# Patient Record
Sex: FEMALE | Race: WHITE | NOT HISPANIC OR LATINO
[De-identification: names, ages, dates, MRNs, and addresses within clinical notes are randomized per-mention and may not be internally consistent; named-entity substitution may affect disease eponyms.]

---

## 2019-02-13 PROBLEM — Z00.00 ENCOUNTER FOR PREVENTIVE HEALTH EXAMINATION: Status: ACTIVE | Noted: 2019-02-13

## 2019-03-19 ENCOUNTER — APPOINTMENT (OUTPATIENT)
Dept: BREAST CENTER | Facility: CLINIC | Age: 69
End: 2019-03-19
Payer: MEDICARE

## 2019-03-19 VITALS
WEIGHT: 135 LBS | BODY MASS INDEX: 25.49 KG/M2 | DIASTOLIC BLOOD PRESSURE: 75 MMHG | HEART RATE: 81 BPM | HEIGHT: 61 IN | SYSTOLIC BLOOD PRESSURE: 134 MMHG

## 2019-03-19 DIAGNOSIS — Z87.59 PERSONAL HISTORY OF OTHER COMPLICATIONS OF PREGNANCY, CHILDBIRTH AND THE PUERPERIUM: ICD-10-CM

## 2019-03-19 DIAGNOSIS — Z78.9 OTHER SPECIFIED HEALTH STATUS: ICD-10-CM

## 2019-03-19 PROCEDURE — 99215 OFFICE O/P EST HI 40 MIN: CPT

## 2019-03-19 NOTE — ASSESSMENT
[FreeTextEntry1] : 69 yo History of stage I and a right breast cancer estrogen positive, JOCELYN\par Due for bilateral mammogram and ultrasound March 2020\par She does not want to followup with medical oncology\par Followup with me in one year for clinical breast exam\par We reviewed risk reduction strategies including maintaining a BMI <25, limiting red meat intake and alcoholic beverages to 3 per week and exercise (150 min/ week low intensity or 75 min/week high intensity). And maintaining a normal vitamin D level.\par \par She knows to call or return sooner should any concerns or questions arise.\par

## 2019-03-19 NOTE — PHYSICAL EXAM
[Normocephalic] : normocephalic [Atraumatic] : atraumatic [Supple] : supple [No Supraclavicular Adenopathy] : no supraclavicular adenopathy [Examined in the supine and seated position] : examined in the supine and seated position [No dominant masses] : no dominant masses in right breast  [No dominant masses] : no dominant masses left breast [No Nipple Retraction] : no left nipple retraction [No Nipple Discharge] : no left nipple discharge [No Axillary Lymphadenopathy] : no left axillary lymphadenopathy [No Edema] : no edema [No Rashes] : no rashes [No Ulceration] : no ulceration [de-identified] : Well-healed Wise pattern scar, diffuse brawny induration, there is a 2-1/2 cm mass 12:00 area were noted consistent with known fat necrosis. There is a second small 0.5 cm similar area under the area looked at 4 to 5:00. No suspicious masses, nontender no skin changes, no nodules [de-identified] : healed Wise pattern [de-identified] : healed axillary inciison

## 2019-03-19 NOTE — HISTORY OF PRESENT ILLNESS
[FreeTextEntry1] : This is a 69 yo woman s/p right partial mastectomy,SNE, and symmetrizing reduction/pexy (DDP) on 1/18/2007 for a stage 1a invasive ductal carcinoma, T1c (1.1cm) N0(sn,ihc-), 5/9, no LVI,  ER/[place]+, her2-.  Her oncotype was 20 (Dr. Cao). She is s/p RTX ([place]). She is s/p 3 years of tamoxifen, which she stopped on her own because she "didn't like the way it made (me) feel". She is BRCA negative. \par \par Patient has no breast complaints today. She is helping take care of her mother after she had fallen and incurred a rotator cuff injury.\par \par She does SBE. \par She has not noticed a change in her breast or a breast lump\par She has not noticed a change in her nipple or nipple area.\par She has not noticed a change in the skin of the breast.\par She is not experiencing nipple discharge.\par She is not experiencing breast pain.\par She has not noticed a lump or lymph node under the armpit. \par \par BREAST CANCER RISK FACTORS\par Menarche: 14\par Menopause: 52\par Grav:  2    Para:  1\par Age at first live birth: 27\par Nursed: Yes\par Hysterectomy: No\par Oophorectomy: No\par OCP: Yes x  5y\par HRT: No\par Last pap/pelvic exam: 10/2014 WNL\par Related family history: No\par Ashkenazi: Yes\par Tyrer-[place]/NCI lifetime risk: N/A\par BRCA testing: Yes negative (comprehensive)\par \par Last mammogram: 3/5/2019          Location: [facility] Report reviewed.                                \par Results: Birads 2\par No evidence of malignancy\par \par Last ultrasound:  3/5/2019       Location: [facility] Report reviewed.                                \par Results: Birads 2\par No evidence of malignancy\par \par \par Last MRI:        preop

## 2020-01-30 ENCOUNTER — APPOINTMENT (OUTPATIENT)
Dept: NEPHROLOGY | Facility: CLINIC | Age: 70
End: 2020-01-30
Payer: MEDICARE

## 2020-01-30 VITALS
SYSTOLIC BLOOD PRESSURE: 152 MMHG | RESPIRATION RATE: 16 BRPM | HEIGHT: 61 IN | WEIGHT: 140 LBS | BODY MASS INDEX: 26.43 KG/M2 | OXYGEN SATURATION: 98 % | HEART RATE: 88 BPM | DIASTOLIC BLOOD PRESSURE: 66 MMHG

## 2020-01-30 DIAGNOSIS — M19.90 UNSPECIFIED OSTEOARTHRITIS, UNSPECIFIED SITE: ICD-10-CM

## 2020-01-30 DIAGNOSIS — D64.9 ANEMIA, UNSPECIFIED: ICD-10-CM

## 2020-01-30 DIAGNOSIS — Z78.9 OTHER SPECIFIED HEALTH STATUS: ICD-10-CM

## 2020-01-30 DIAGNOSIS — Z92.89 PERSONAL HISTORY OF OTHER MEDICAL TREATMENT: ICD-10-CM

## 2020-01-30 DIAGNOSIS — I10 ESSENTIAL (PRIMARY) HYPERTENSION: ICD-10-CM

## 2020-01-30 DIAGNOSIS — E11.9 TYPE 2 DIABETES MELLITUS W/OUT COMPLICATIONS: ICD-10-CM

## 2020-01-30 DIAGNOSIS — E61.1 IRON DEFICIENCY: ICD-10-CM

## 2020-01-30 DIAGNOSIS — N20.0 CALCULUS OF KIDNEY: ICD-10-CM

## 2020-01-30 DIAGNOSIS — N17.9 ACUTE KIDNEY FAILURE, UNSPECIFIED: ICD-10-CM

## 2020-01-30 PROCEDURE — 99204 OFFICE O/P NEW MOD 45 MIN: CPT

## 2020-01-30 RX ORDER — METFORMIN HYDROCHLORIDE 625 MG/1
TABLET ORAL
Refills: 0 | Status: DISCONTINUED | COMMUNITY
End: 2020-01-30

## 2020-01-30 RX ORDER — INSULIN GLARGINE 300 U/ML
INJECTION, SOLUTION SUBCUTANEOUS
Refills: 0 | Status: DISCONTINUED | COMMUNITY
End: 2020-01-30

## 2020-01-30 RX ORDER — ASPIRIN 81 MG
81 TABLET, DELAYED RELEASE (ENTERIC COATED) ORAL
Refills: 0 | Status: DISCONTINUED | COMMUNITY
End: 2020-01-30

## 2020-01-30 NOTE — ASSESSMENT
[FreeTextEntry1] : Mary James is a 69 year old woman referred for the following BUN/creatinine trend:  18/1.01 (03/07/2019), 26/1.51 (12/12/2019). She has diabetes mellitus, hypertension treated with losartan, dyslipidemia, iron deficiency, a remote history of nephrolithiasis, and arthritis initially treated with NSAIDS (diclofenac, Advil) and currently treated with Plaquenil.  She is seenig Dr. Martin who is working up the arthritic symptoms.  This history is highly suggestive of ANIYA secondary to NSAID use while taking losartan.  Of note, Ms. James had a urinalysis which demonstrated 1+ dipstick albuminuria without any microscopic hematuria in March of 2019. I can not rule out underlying renal disease.\par \par RECOMMENDATIONS:\par \par As Ms. James recently had labs with Dr Gloria No I am not ordering anything yet.  Ms. James should have the following tests done:  BMP, urinalysis, random urine for microalbumin and creatinine. \par \par Renal ultrasound.\par \par Avoid the use of all NSAIDS.\par \par Stay well hydrated.\par \par If all tests are 'normal', I will see Ms. James in the future at your request.   She will call me in 2 weeks. \par

## 2020-01-30 NOTE — HISTORY OF PRESENT ILLNESS
[FreeTextEntry1] : Mary James is a 69 year old woman referred for the following BUN/creatinine trend:  18/1.01 (03/07/2019), 26/1.51 (12/12/2019). Ms. James has a past medical history significant for diabetes mellitus since age 42 (suspected peripheral neuropathy, denies diabetic retinopathy), hypertension, dyslipidemia, anemia with iron deficiency, arthritis (recently started on Plaquenil), nephrolithiasis (first renal colic in 2003, lithotripsy on 2 occasions, last event prior to 2010), and breast cancer (ER positive) s/p partial mastectomy (Tamoxifen for 2 years) with reconstruction and radiation therapy.  Her hypertension is treated with losartan.  The dose has not changed in the past year.   Ms. James denies being ill at the time of the lab studies in December.  She takes Advil "often".  She might have been taking diclofenac prior to the lab studies of 12/12/2019.  She has since stopped.

## 2020-01-30 NOTE — REVIEW OF SYSTEMS
[Feeling Tired] : feeling tired [Joint Pain] : joint pain [Cough] : cough [Loss Of Hearing] : hearing loss [Negative] : Endocrine [FreeTextEntry2] : has anemia, sleeps poorly [FreeTextEntry6] : new cough ( was sick), present for 1.5 weeks, non productive, not improving [FreeTextEntry8] : nocturia x 3 [FreeTextEntry9] : elbows, knees, ankles, wrists, toes

## 2020-01-30 NOTE — CONSULT LETTER
[Dear  ___] : Dear  [unfilled], [Consult Letter:] : I had the pleasure of evaluating your patient, [unfilled]. [Please see my note below.] : Please see my note below. [Consult Closing:] : Thank you very much for allowing me to participate in the care of this patient.  If you have any questions, please do not hesitate to contact me. [Sincerely,] : Sincerely, [FreeTextEntry3] : Geoffrey [DrYesi  ___] : Dr. ECHOLS [DrYesi ___] : Dr. ECHOLS

## 2020-01-30 NOTE — PHYSICAL EXAM
[General Appearance - Alert] : alert [Sclera] : the sclera and conjunctiva were normal [General Appearance - In No Acute Distress] : in no acute distress [Extraocular Movements] : extraocular movements were intact [Neck Appearance] : the appearance of the neck was normal [] : no respiratory distress [Respiration, Rhythm And Depth] : normal respiratory rhythm and effort [Exaggerated Use Of Accessory Muscles For Inspiration] : no accessory muscle use [Auscultation Breath Sounds / Voice Sounds] : lungs were clear to auscultation bilaterally [Heart Sounds] : normal S1 and S2 [Heart Rate And Rhythm] : heart rate was normal and rhythm regular [Heart Sounds Gallop] : no gallops [Murmurs] : no murmurs [Heart Sounds Pericardial Friction Rub] : no pericardial rub [Full Pulse] : the pedal pulses are present [Edema] : there was no peripheral edema [Bowel Sounds] : normal bowel sounds [Abdomen Soft] : soft [Abdomen Tenderness] : non-tender [No CVA Tenderness] : no ~M costovertebral angle tenderness [No Spinal Tenderness] : no spinal tenderness [Abnormal Walk] : normal gait [Nail Clubbing] : no clubbing  or cyanosis of the fingernails [Involuntary Movements] : no involuntary movements were seen [Skin Color & Pigmentation] : normal skin color and pigmentation [No Focal Deficits] : no focal deficits [Oriented To Time, Place, And Person] : oriented to person, place, and time [Impaired Insight] : insight and judgment were intact [Affect] : the affect was normal [Mood] : the mood was normal

## 2020-03-17 ENCOUNTER — APPOINTMENT (OUTPATIENT)
Dept: BREAST CENTER | Facility: CLINIC | Age: 70
End: 2020-03-17

## 2021-03-16 ENCOUNTER — APPOINTMENT (OUTPATIENT)
Dept: BREAST CENTER | Facility: CLINIC | Age: 71
End: 2021-03-16
Payer: MEDICARE

## 2021-03-16 VITALS
HEART RATE: 70 BPM | WEIGHT: 145 LBS | SYSTOLIC BLOOD PRESSURE: 138 MMHG | DIASTOLIC BLOOD PRESSURE: 80 MMHG | HEIGHT: 61 IN | BODY MASS INDEX: 27.38 KG/M2

## 2021-03-16 DIAGNOSIS — N64.1 FAT NECROSIS OF BREAST: ICD-10-CM

## 2021-03-16 DIAGNOSIS — Z82.49 FAMILY HISTORY OF ISCHEMIC HEART DISEASE AND OTHER DISEASES OF THE CIRCULATORY SYSTEM: ICD-10-CM

## 2021-03-16 DIAGNOSIS — Z80.42 FAMILY HISTORY OF MALIGNANT NEOPLASM OF PROSTATE: ICD-10-CM

## 2021-03-16 DIAGNOSIS — Z92.3 PERSONAL HISTORY OF IRRADIATION: ICD-10-CM

## 2021-03-16 DIAGNOSIS — Z85.3 PERSONAL HISTORY OF MALIGNANT NEOPLASM OF BREAST: ICD-10-CM

## 2021-03-16 DIAGNOSIS — Z80.8 FAMILY HISTORY OF MALIGNANT NEOPLASM OF OTHER ORGANS OR SYSTEMS: ICD-10-CM

## 2021-03-16 PROCEDURE — 99215 OFFICE O/P EST HI 40 MIN: CPT

## 2021-03-16 NOTE — HISTORY OF PRESENT ILLNESS
[FreeTextEntry1] : This is a 69 yo woman s/p right partial mastectomy,SNE, and symmetrizing reduction/pexy (DDP) on 1/18/2007 for a stage 1a invasive ductal carcinoma, T1c (1.1cm) N0(sn,ihc-), 5/9, no LVI,  ER/[place]+, her2-.  Her oncotype was 20 (Dr. Cao). She is s/p RTX ([place]). She is s/p 3 years of tamoxifen, which she stopped on her own because she "didn't like the way it made (me) feel". She is BRCA negative. \par \par Patient has no breast complaints today. She is helping take care of her mother after she had fallen and incurred a rotator cuff injury. She finished her vaccination 3/14-Moderna left arm.\par \par She does SBE. \par She has not noticed a change in her breast or a breast lump\par She has not noticed a change in her nipple or nipple area.\par She has not noticed a change in the skin of the breast.\par She is not experiencing nipple discharge.\par She is not experiencing breast pain. She is experiencing right axilla soreness with touch. For about 3 months ago. She is right handed. She has some shoulder pain as well.\par She has not noticed a lump or lymph node under the armpit. \par \par BREAST CANCER RISK FACTORS\par Menarche: 14\par Menopause: 52\par Grav:  2    Para:  1\par Age at first live birth: 27\par Nursed: Yes\par Hysterectomy: No\par Oophorectomy: No\par OCP: Yes x  5y\par HRT: No\par Last pap/pelvic exam: 10/2014 WNL\par Related family history: No\par Ashkenazi: Yes\par Tyrer-[place]/NCI lifetime risk: N/A\par BRCA testing: Yes negative (comprehensive)\par \par Last mammogram: 3/10/2021         Location: [place]\par Report reviewed.                                \par Results: Birads 2\par No evidence of malignancy\par \par Last ultrasound:  3/10/2021       Location: [place]\par Report reviewed.                                \par Results: Birads 2\par No evidence of malignancy\par \par Last MRI:        preop

## 2021-03-16 NOTE — ASSESSMENT
[FreeTextEntry1] : 71 yo History of stage I and a right breast cancer estrogen positive, JOCELYN\par Due for bilateral mammogram and ultrasound March 2022\par She does not want to followup with medical oncology\par Followup with me in one year for clinical breast exam\par We reviewed risk reduction strategies including maintaining a BMI <25, limiting red meat intake and alcoholic beverages to 3 per week and exercise (150 min/ week low intensity or 75 min/week high intensity). And maintaining a normal vitamin D level.\par rec orthopedic cs for right shoulder\par She knows to call or return sooner should any concerns or questions arise.\par

## 2021-03-16 NOTE — PHYSICAL EXAM
[Normocephalic] : normocephalic [Atraumatic] : atraumatic [Supple] : supple [No Supraclavicular Adenopathy] : no supraclavicular adenopathy [Examined in the supine and seated position] : examined in the supine and seated position [No dominant masses] : no dominant masses in right breast  [No dominant masses] : no dominant masses left breast [No Nipple Retraction] : no left nipple retraction [No Nipple Discharge] : no left nipple discharge [No Axillary Lymphadenopathy] : no left axillary lymphadenopathy [No Edema] : no edema [No Rashes] : no rashes [No Ulceration] : no ulceration [de-identified] : Well-healed Wise pattern scar, diffuse brawny induration, No suspicious masses, nontender no skin changes, no nodules [de-identified] : healed Wise pattern [de-identified] : healed axillary inciison

## 2022-03-15 ENCOUNTER — APPOINTMENT (OUTPATIENT)
Dept: BREAST CENTER | Facility: CLINIC | Age: 72
End: 2022-03-15
Payer: MEDICARE

## 2022-03-15 ENCOUNTER — NON-APPOINTMENT (OUTPATIENT)
Age: 72
End: 2022-03-15

## 2022-03-15 VITALS
HEART RATE: 79 BPM | DIASTOLIC BLOOD PRESSURE: 72 MMHG | HEIGHT: 61 IN | WEIGHT: 145 LBS | BODY MASS INDEX: 27.38 KG/M2 | SYSTOLIC BLOOD PRESSURE: 185 MMHG

## 2022-03-15 PROCEDURE — 99214 OFFICE O/P EST MOD 30 MIN: CPT

## 2022-03-15 RX ORDER — SITAGLIPTIN AND METFORMIN HYDROCHLORIDE 50; 1000 MG/1; MG/1
TABLET, FILM COATED ORAL
Refills: 0 | Status: ACTIVE | COMMUNITY

## 2022-03-15 RX ORDER — ALLOPURINOL 200 MG/1
TABLET ORAL
Refills: 0 | Status: ACTIVE | COMMUNITY

## 2022-03-15 RX ORDER — LOSARTAN POTASSIUM 100 MG/1
100 TABLET, FILM COATED ORAL
Refills: 0 | Status: ACTIVE | COMMUNITY

## 2022-03-15 RX ORDER — HYDROXYCHLOROQUINE SULFATE 200 MG/1
200 TABLET, FILM COATED ORAL
Refills: 0 | Status: ACTIVE | COMMUNITY

## 2022-03-15 RX ORDER — OMEGA-3/DHA/EPA/FISH OIL 300-1000MG
CAPSULE ORAL
Refills: 0 | Status: ACTIVE | COMMUNITY

## 2022-03-15 RX ORDER — INSULIN GLARGINE 100 [IU]/ML
INJECTION, SOLUTION SUBCUTANEOUS
Refills: 0 | Status: ACTIVE | COMMUNITY

## 2022-03-15 RX ORDER — ATORVASTATIN CALCIUM 40 MG/1
40 TABLET, FILM COATED ORAL
Refills: 0 | Status: ACTIVE | COMMUNITY

## 2022-03-15 NOTE — ASSESSMENT
[FreeTextEntry1] : 70 yo History of stage I and a right breast cancer estrogen positive, JOCELYN\par Due for bilateral mammogram and ultrasound March 2023\par She does not want to followup with medical oncology\par Followup with me in one year for clinical breast exam\par We reviewed risk reduction strategies including maintaining a BMI <25, limiting red meat intake and alcoholic beverages to 3 per week and exercise (150 min/ week low intensity or 75 min/week high intensity). And maintaining a normal vitamin D level.\par \par She knows to call or return sooner should any concerns or questions arise.\par  cough

## 2022-03-15 NOTE — PHYSICAL EXAM
[Normocephalic] : normocephalic [Atraumatic] : atraumatic [Supple] : supple [No Supraclavicular Adenopathy] : no supraclavicular adenopathy [Examined in the supine and seated position] : examined in the supine and seated position [No dominant masses] : no dominant masses in right breast  [No dominant masses] : no dominant masses left breast [No Nipple Retraction] : no left nipple retraction [No Nipple Discharge] : no left nipple discharge [No Axillary Lymphadenopathy] : no left axillary lymphadenopathy [No Edema] : no edema [No Rashes] : no rashes [No Ulceration] : no ulceration [de-identified] : Well-healed Wise pattern scar, diffuse brawny induration, No suspicious masses, nontender no skin changes, no nodules, "aditya" fat necrosis stable [de-identified] : healed Wise pattern [de-identified] : healed axillary inciison

## 2022-03-15 NOTE — CONSULT LETTER
[Dear  ___] : Dear ~VIBHA, [Courtesy Letter:] : I had the pleasure of seeing your patient, [unfilled], in my office today. [Please see my note below.] : Please see my note below. [Consult Closing:] : Thank you very much for allowing me to participate in the care of this patient.  If you have any questions, please do not hesitate to contact me. [Sincerely,] : Sincerely, [FreeTextEntry1] : Her BP was high today at office. She has been monitoring at home (was 122/56 yesterday at home) and will be following up with you as well.   [FreeTextEntry3] : Diana Stewart MS DO\par Breast Surgeon\par [facility] \[place] [address]\par

## 2023-03-06 ENCOUNTER — RESULT REVIEW (OUTPATIENT)
Age: 73
End: 2023-03-06

## 2023-03-21 ENCOUNTER — APPOINTMENT (OUTPATIENT)
Dept: BREAST CENTER | Facility: CLINIC | Age: 73
End: 2023-03-21
Payer: MEDICARE

## 2023-03-21 VITALS
WEIGHT: 150 LBS | HEART RATE: 81 BPM | DIASTOLIC BLOOD PRESSURE: 68 MMHG | SYSTOLIC BLOOD PRESSURE: 135 MMHG | BODY MASS INDEX: 28.32 KG/M2 | HEIGHT: 61 IN

## 2023-03-21 DIAGNOSIS — R92.2 INCONCLUSIVE MAMMOGRAM: ICD-10-CM

## 2023-03-21 PROCEDURE — 99213 OFFICE O/P EST LOW 20 MIN: CPT

## 2023-03-21 NOTE — HISTORY OF PRESENT ILLNESS
[FreeTextEntry1] : This is a 71 yo woman s/p right partial mastectomy,SNE, and symmetrizing reduction/pexy (DDP) on 1/18/2007 for a stage 1a invasive ductal carcinoma, T1c (1.1cm) N0(sn,ihc-), 5/9, no LVI,  ER/[place]+, her2-.  Her oncotype was 20 (Dr. Cao). She is s/p RTX ([place]). She is s/p 3 years of tamoxifen, which she stopped on her own because she "didn't like the way it made (me) feel". She is BRCA negative. \par \par Patient has no breast complaints today. She is helping take care of her mother after she had fallen and incurred a rotator cuff injury. She finished her vaccination 3/14-Moderna left arm. Her mother was dx with vulvar cancer at age 91 and is undergoing chemotherapy.\par \par She does SBE. \par She has not noticed a change in her breast or a breast lump\par She has not noticed a change in her nipple or nipple area.\par She has not noticed a change in the skin of the breast.\par She is not experiencing nipple discharge.\par She is not experiencing breast pain.  She is right handed. She has some shoulder pain as well.\par She has not noticed a lump or lymph node under the armpit. \par \par BREAST CANCER RISK FACTORS\par Menarche: 14\par Menopause: 52\par Grav:  2    Para:  1\par Age at first live birth: 27\par Nursed: Yes\par Hysterectomy: No\par Oophorectomy: No\par OCP: Yes x  5y\par HRT: No\par Last pap/pelvic exam: 10/2014 WNL\par Related family history: mom vulva cancer\par Ashkenazi: Yes\par Ty[place]/NCI lifetime risk: N/A\par BRCA testing: Yes negative (comprehensive)\par \par Last mammogram: 3/07/2023        Location: [place]\par Report reviewed.                                \par Results: Birads 2\par No evidence of malignancy\par \par Last ultrasound:  3/07/2023     Location: [place]\par Report reviewed.                                \par Results: Birads 2\par No evidence of malignancy\par \par Last MRI:        preop

## 2023-03-21 NOTE — ASSESSMENT
[FreeTextEntry1] : 73 yo History of stage I and a right breast cancer estrogen positive, JOCELYN\par Due for bilateral mammogram and ultrasound March 2024\par She does not want to followup with medical oncology\par \par We reviewed risk reduction strategies including maintaining a BMI <25, limiting red meat intake and alcoholic beverages to 3 per week and exercise (150 min/ week low intensity or 75 min/week high intensity). And maintaining a normal vitamin D level.\par Followup with me in one year for clinical breast exam\par She knows to call or return sooner should any concerns or questions arise.\par

## 2023-03-21 NOTE — PHYSICAL EXAM
[Normocephalic] : normocephalic [Atraumatic] : atraumatic [Supple] : supple [No Supraclavicular Adenopathy] : no supraclavicular adenopathy [Examined in the supine and seated position] : examined in the supine and seated position [No dominant masses] : no dominant masses in right breast  [No dominant masses] : no dominant masses left breast [No Nipple Retraction] : no left nipple retraction [No Nipple Discharge] : no left nipple discharge [No Axillary Lymphadenopathy] : no left axillary lymphadenopathy [No Edema] : no edema [No Rashes] : no rashes [No Ulceration] : no ulceration [Symmetrical] : symmetrical [de-identified] : Well-healed Wise pattern scar, diffuse brawny induration, No suspicious masses, nontender no skin changes, no nodules, "aditya" fat necrosis stable [de-identified] : healed Wise pattern [de-identified] : healed axillary inciison

## 2023-03-21 NOTE — CONSULT LETTER
[Dear  ___] : Dear ~VIBHA, [Courtesy Letter:] : I had the pleasure of seeing your patient, [unfilled], in my office today. [Please see my note below.] : Please see my note below. [Consult Closing:] : Thank you very much for allowing me to participate in the care of this patient.  If you have any questions, please do not hesitate to contact me. [Sincerely,] : Sincerely, [FreeTextEntry1] : Her BP was high today at office. She has been monitoring at home (was 122/56 yesterday at home) and will be following up with you as well.   [FreeTextEntry3] : Diana Stewart MS DO\par Breast Surgeon\par [facility] \[place] [address]\par

## 2023-11-07 NOTE — HISTORY OF PRESENT ILLNESS
66
[FreeTextEntry1] : This is a 72 yo woman s/p right partial mastectomy,SNE, and symmetrizing reduction/pexy (DDP) on 1/18/2007 for a stage 1a invasive ductal carcinoma, T1c (1.1cm) N0(sn,ihc-), 5/9, no LVI,  ER/PA+, her2-.  Her oncotype was 20 (Dr. Cao). She is s/p RTX ([place]). She is s/p 3 years of tamoxifen, which she stopped on her own because she "didn't like the way it made (me) feel". She is BRCA negative. \par \par Patient has no breast complaints today. She is helping take care of her mother after she had fallen and incurred a rotator cuff injury. She finished her vaccination 3/14-Moderna left arm.\par \par She does SBE. \par She has not noticed a change in her breast or a breast lump\par She has not noticed a change in her nipple or nipple area.\par She has not noticed a change in the skin of the breast.\par She is not experiencing nipple discharge.\par She is not experiencing breast pain.  She is right handed. She has some shoulder pain as well.\par She has not noticed a lump or lymph node under the armpit. \par \par BREAST CANCER RISK FACTORS\par Menarche: 14\par Menopause: 52\par Grav:  2    Para:  1\par Age at first live birth: 27\par Nursed: Yes\par Hysterectomy: No\par Oophorectomy: No\par OCP: Yes x  5y\par HRT: No\par Last pap/pelvic exam: 10/2014 WNL\par Related family history: No\par Ashkenazi: Yes\par Tyrer-[place]/NCI lifetime risk: N/A\par BRCA testing: Yes negative (comprehensive)\par \par Last mammogram: 3/07/2022         Location: [place]\par Report reviewed.                                \par Results: Birads 2\par No evidence of malignancy\par \par Last ultrasound:  3/07/2022     Location: [place]\par Report reviewed.                                \par Results: Birads 2\par No evidence of malignancy\par \par Last MRI:        preop

## 2024-03-11 ENCOUNTER — RESULT REVIEW (OUTPATIENT)
Age: 74
End: 2024-03-11

## 2024-03-26 ENCOUNTER — APPOINTMENT (OUTPATIENT)
Dept: BREAST CENTER | Facility: CLINIC | Age: 74
End: 2024-03-26
Payer: MEDICARE

## 2024-03-26 VITALS
SYSTOLIC BLOOD PRESSURE: 142 MMHG | HEIGHT: 61 IN | WEIGHT: 150 LBS | HEART RATE: 79 BPM | BODY MASS INDEX: 28.32 KG/M2 | DIASTOLIC BLOOD PRESSURE: 61 MMHG

## 2024-03-26 DIAGNOSIS — E78.00 PURE HYPERCHOLESTEROLEMIA, UNSPECIFIED: ICD-10-CM

## 2024-03-26 DIAGNOSIS — Z12.31 ENCOUNTER FOR SCREENING MAMMOGRAM FOR MALIGNANT NEOPLASM OF BREAST: ICD-10-CM

## 2024-03-26 DIAGNOSIS — N60.19 DIFFUSE CYSTIC MASTOPATHY OF UNSPECIFIED BREAST: ICD-10-CM

## 2024-03-26 PROCEDURE — 99214 OFFICE O/P EST MOD 30 MIN: CPT

## 2024-03-26 RX ORDER — SEMAGLUTIDE 1.34 MG/ML
2 INJECTION, SOLUTION SUBCUTANEOUS
Refills: 0 | Status: ACTIVE | COMMUNITY

## 2024-03-26 RX ORDER — PNV NO.95/FERROUS FUM/FOLIC AC 28MG-0.8MG
TABLET ORAL
Refills: 0 | Status: DISCONTINUED | COMMUNITY
End: 2024-03-26

## 2024-03-26 RX ORDER — PREGABALIN 25 MG/1
25 CAPSULE ORAL
Refills: 0 | Status: ACTIVE | COMMUNITY

## 2024-03-26 RX ORDER — ALENDRONATE SODIUM 70 MG/1
70 TABLET ORAL
Refills: 0 | Status: DISCONTINUED | COMMUNITY
End: 2024-03-26

## 2024-03-26 NOTE — HISTORY OF PRESENT ILLNESS
[FreeTextEntry1] : This is a 74 yo woman s/p right partial mastectomy,SNE, and symmetrizing reduction/pexy (DDP) on 1/18/2007 for a stage 1a invasive ductal carcinoma, T1c (1.1cm) N0(sn,ihc-), 5/9, no LVI,  ER/[place]+, her2-.  Her oncotype was 20 (Dr. Cao). She is s/p RTX ([place]). She is s/p 3 years of tamoxifen, which she stopped on her own because she "didn't like the way it made (me) feel". She is BRCA negative.   Patient has no breast complaints today. She is helping take care of her mother after she had fallen and incurred a rotator cuff injury. She finished her vaccination 3/14-Moderna left arm. Her mother was dx with vulvar cancer at age 92 who completed chemotherapy and is now on immunotherapy.  03/26/2024 - Pt has no complaints today. She denies any changes in medical surgical or family history since last visit. She recently started Ozempic.   She does SBE.  She has not noticed a change in her breast or a breast lump She has not noticed a change in her nipple or nipple area. She has not noticed a change in the skin of the breast. She is not experiencing nipple discharge. She is not experiencing breast pain.  She is right handed. She has some shoulder pain as well. She has not noticed a lump or lymph node under the armpit.   BREAST CANCER RISK FACTORS Menarche: 14 Menopause: 52 Grav:  2    Para:  1 Age at first live birth: 27 Nursed: Yes Hysterectomy: No Oophorectomy: No OCP: Yes x  5y HRT: No Last pap/pelvic exam: 10/2014 WNL Related family history: mom vulva cancer Ashkenazi: Yes Tyrer-[place]/NCI lifetime risk: N/A BRCA testing: Yes negative (comprehensive)  Last mammogram: 03/12/2024       Location: [place] Report reviewed.                                 Results: Birads 2 Heterogeneously dense. No evidence of malignancy  Last ultrasound:  3/07/2023     Location: [place] Report reviewed.                                 Results: Birads 2 No evidence of malignancy  Last MRI:        preop

## 2024-03-26 NOTE — CONSULT LETTER
[Dear  ___] : Dear ~VIBHA, [Courtesy Letter:] : I had the pleasure of seeing your patient, [unfilled], in my office today. [Please see my note below.] : Please see my note below. [Sincerely,] : Sincerely, [Consult Closing:] : Thank you very much for allowing me to participate in the care of this patient.  If you have any questions, please do not hesitate to contact me. [FreeTextEntry1] : Please note her breast arterial calcification score. [FreeTextEntry3] : Diana Stewart MS DO\par  Breast Surgeon\par  [facility] \[place] [address]\par

## 2024-03-26 NOTE — ASSESSMENT
[FreeTextEntry1] : 72 yo History of stage I and a right breast cancer estrogen positive, JOCELYN Due for bilateral mammogram March 2025 She does not want to followup with medical oncology  We reviewed risk reduction strategies including maintaining a BMI <25, limiting red meat intake and alcoholic beverages to 3 per week and exercise (150 min/ week low intensity or 75 min/week high intensity). And maintaining a normal vitamin D level. Followup with me in one year for clinical breast exam She knows to call or return sooner should any concerns or questions arise.

## 2024-03-26 NOTE — PHYSICAL EXAM
[Normocephalic] : normocephalic [Atraumatic] : atraumatic [Supple] : supple [No Supraclavicular Adenopathy] : no supraclavicular adenopathy [Examined in the supine and seated position] : examined in the supine and seated position [Symmetrical] : symmetrical [No dominant masses] : no dominant masses in right breast  [No dominant masses] : no dominant masses left breast [No Nipple Retraction] : no left nipple retraction [No Nipple Discharge] : no left nipple discharge [No Axillary Lymphadenopathy] : no left axillary lymphadenopathy [No Edema] : no edema [No Rashes] : no rashes [No Ulceration] : no ulceration [de-identified] : Well-healed Wise pattern scar, diffuse brawny induration, No suspicious masses, nontender no skin changes, no nodules, "aditya" fat necrosis stable [de-identified] : healed axillary inciison [de-identified] : healed Wise pattern

## 2025-03-17 ENCOUNTER — RESULT REVIEW (OUTPATIENT)
Age: 75
End: 2025-03-17

## 2025-04-01 ENCOUNTER — APPOINTMENT (OUTPATIENT)
Dept: BREAST CENTER | Facility: CLINIC | Age: 75
End: 2025-04-01
Payer: MEDICARE

## 2025-04-01 VITALS
BODY MASS INDEX: 24.84 KG/M2 | SYSTOLIC BLOOD PRESSURE: 143 MMHG | DIASTOLIC BLOOD PRESSURE: 67 MMHG | WEIGHT: 131.6 LBS | HEIGHT: 61 IN | HEART RATE: 76 BPM

## 2025-04-01 DIAGNOSIS — R92.30 DENSE BREASTS, UNSPECIFIED: ICD-10-CM

## 2025-04-01 DIAGNOSIS — Z80.42 FAMILY HISTORY OF MALIGNANT NEOPLASM OF PROSTATE: ICD-10-CM

## 2025-04-01 DIAGNOSIS — Z82.49 FAMILY HISTORY OF ISCHEMIC HEART DISEASE AND OTHER DISEASES OF THE CIRCULATORY SYSTEM: ICD-10-CM

## 2025-04-01 DIAGNOSIS — Z71.89 OTHER SPECIFIED COUNSELING: ICD-10-CM

## 2025-04-01 DIAGNOSIS — Z12.31 ENCOUNTER FOR SCREENING MAMMOGRAM FOR MALIGNANT NEOPLASM OF BREAST: ICD-10-CM

## 2025-04-01 DIAGNOSIS — N64.1 FAT NECROSIS OF BREAST: ICD-10-CM

## 2025-04-01 DIAGNOSIS — N60.19 DIFFUSE CYSTIC MASTOPATHY OF UNSPECIFIED BREAST: ICD-10-CM

## 2025-04-01 DIAGNOSIS — Z85.3 PERSONAL HISTORY OF MALIGNANT NEOPLASM OF BREAST: ICD-10-CM

## 2025-04-01 DIAGNOSIS — Z80.8 FAMILY HISTORY OF MALIGNANT NEOPLASM OF OTHER ORGANS OR SYSTEMS: ICD-10-CM

## 2025-04-01 DIAGNOSIS — Z92.3 PERSONAL HISTORY OF IRRADIATION: ICD-10-CM

## 2025-04-01 DIAGNOSIS — Z92.89 PERSONAL HISTORY OF OTHER MEDICAL TREATMENT: ICD-10-CM

## 2025-04-01 PROCEDURE — 99213 OFFICE O/P EST LOW 20 MIN: CPT

## 2025-04-01 RX ORDER — METHOTREXATE 2.5 MG/1
TABLET ORAL
Refills: 0 | Status: ACTIVE | COMMUNITY

## 2025-04-01 RX ORDER — CHROMIUM 200 MCG
TABLET ORAL
Refills: 0 | Status: ACTIVE | COMMUNITY

## 2025-04-01 RX ORDER — METFORMIN HYDROCHLORIDE 750 MG/1
TABLET ORAL
Refills: 0 | Status: ACTIVE | COMMUNITY

## 2025-04-15 ENCOUNTER — APPOINTMENT (OUTPATIENT)
Dept: HEART AND VASCULAR | Facility: CLINIC | Age: 75
End: 2025-04-15
Payer: MEDICARE

## 2025-04-15 VITALS
SYSTOLIC BLOOD PRESSURE: 142 MMHG | BODY MASS INDEX: 24.55 KG/M2 | RESPIRATION RATE: 16 BRPM | WEIGHT: 130 LBS | HEART RATE: 84 BPM | OXYGEN SATURATION: 96 % | TEMPERATURE: 97.2 F | DIASTOLIC BLOOD PRESSURE: 60 MMHG | HEIGHT: 61 IN

## 2025-04-15 DIAGNOSIS — I10 ESSENTIAL (PRIMARY) HYPERTENSION: ICD-10-CM

## 2025-04-15 DIAGNOSIS — R07.89 OTHER CHEST PAIN: ICD-10-CM

## 2025-04-15 DIAGNOSIS — R01.1 CARDIAC MURMUR, UNSPECIFIED: ICD-10-CM

## 2025-04-15 DIAGNOSIS — E78.00 PURE HYPERCHOLESTEROLEMIA, UNSPECIFIED: ICD-10-CM

## 2025-04-15 DIAGNOSIS — R93.1 ABNORMAL FINDINGS ON DIAGNOSTIC IMAGING OF HEART AND CORONARY CIRCULATION: ICD-10-CM

## 2025-04-15 PROCEDURE — 99204 OFFICE O/P NEW MOD 45 MIN: CPT

## 2025-04-15 PROCEDURE — G2211 COMPLEX E/M VISIT ADD ON: CPT

## 2025-04-15 PROCEDURE — 93000 ELECTROCARDIOGRAM COMPLETE: CPT

## 2025-04-23 ENCOUNTER — TRANSCRIPTION ENCOUNTER (OUTPATIENT)
Age: 75
End: 2025-04-23

## 2025-04-23 ENCOUNTER — APPOINTMENT (OUTPATIENT)
Dept: COLORECTAL SURGERY | Facility: CLINIC | Age: 75
End: 2025-04-23
Payer: MEDICARE

## 2025-04-23 VITALS
BODY MASS INDEX: 24.55 KG/M2 | HEART RATE: 68 BPM | SYSTOLIC BLOOD PRESSURE: 132 MMHG | OXYGEN SATURATION: 98 % | WEIGHT: 130 LBS | HEIGHT: 61 IN | TEMPERATURE: 98.4 F | DIASTOLIC BLOOD PRESSURE: 62 MMHG

## 2025-04-23 DIAGNOSIS — R10.84 GENERALIZED ABDOMINAL PAIN: ICD-10-CM

## 2025-04-23 PROCEDURE — 99204 OFFICE O/P NEW MOD 45 MIN: CPT

## 2025-04-25 ENCOUNTER — NON-APPOINTMENT (OUTPATIENT)
Age: 75
End: 2025-04-25

## 2025-04-25 LAB
ALBUMIN SERPL ELPH-MCNC: 4.2 G/DL
ALP BLD-CCNC: 76 U/L
ALT SERPL-CCNC: 25 U/L
ANION GAP SERPL CALC-SCNC: 13 MMOL/L
APTT BLD: 28.6 SEC
AST SERPL-CCNC: 25 U/L
BASOPHILS # BLD AUTO: 0.02 K/UL
BASOPHILS NFR BLD AUTO: 0.2 %
BILIRUB SERPL-MCNC: 0.5 MG/DL
BUN SERPL-MCNC: 17 MG/DL
CALCIUM SERPL-MCNC: 9.5 MG/DL
CHLORIDE SERPL-SCNC: 100 MMOL/L
CO2 SERPL-SCNC: 22 MMOL/L
CREAT SERPL-MCNC: 1.36 MG/DL
EGFRCR SERPLBLD CKD-EPI 2021: 41 ML/MIN/1.73M2
EOSINOPHIL # BLD AUTO: 0.44 K/UL
EOSINOPHIL NFR BLD AUTO: 4.9 %
GLUCOSE SERPL-MCNC: 94 MG/DL
HCT VFR BLD CALC: 35.9 %
HGB BLD-MCNC: 11.7 G/DL
IMM GRANULOCYTES NFR BLD AUTO: 0.3 %
INR PPP: 0.94 RATIO
LYMPHOCYTES # BLD AUTO: 2.22 K/UL
LYMPHOCYTES NFR BLD AUTO: 24.9 %
MAN DIFF?: NORMAL
MCHC RBC-ENTMCNC: 31 PG
MCHC RBC-ENTMCNC: 32.6 G/DL
MCV RBC AUTO: 95.2 FL
MONOCYTES # BLD AUTO: 0.89 K/UL
MONOCYTES NFR BLD AUTO: 10 %
NEUTROPHILS # BLD AUTO: 5.33 K/UL
NEUTROPHILS NFR BLD AUTO: 59.7 %
PLATELET # BLD AUTO: 283 K/UL
POTASSIUM SERPL-SCNC: 5.6 MMOL/L
PROT SERPL-MCNC: 6.8 G/DL
PT BLD: 11.2 SEC
RBC # BLD: 3.77 M/UL
RBC # FLD: 17.2 %
SODIUM SERPL-SCNC: 135 MMOL/L
WBC # FLD AUTO: 8.93 K/UL

## 2025-05-01 ENCOUNTER — RESULT REVIEW (OUTPATIENT)
Age: 75
End: 2025-05-01

## 2025-05-02 ENCOUNTER — NON-APPOINTMENT (OUTPATIENT)
Age: 75
End: 2025-05-02

## 2025-05-15 ENCOUNTER — APPOINTMENT (OUTPATIENT)
Dept: GASTROENTEROLOGY | Facility: CLINIC | Age: 75
End: 2025-05-15
Payer: MEDICARE

## 2025-05-15 VITALS
BODY MASS INDEX: 24.55 KG/M2 | WEIGHT: 130 LBS | DIASTOLIC BLOOD PRESSURE: 62 MMHG | OXYGEN SATURATION: 98 % | SYSTOLIC BLOOD PRESSURE: 122 MMHG | HEART RATE: 78 BPM | HEIGHT: 61 IN

## 2025-05-15 DIAGNOSIS — Z12.11 ENCOUNTER FOR SCREENING FOR MALIGNANT NEOPLASM OF COLON: ICD-10-CM

## 2025-05-15 PROCEDURE — 99203 OFFICE O/P NEW LOW 30 MIN: CPT

## 2025-05-15 PROCEDURE — G2211 COMPLEX E/M VISIT ADD ON: CPT

## 2025-05-20 ENCOUNTER — NON-APPOINTMENT (OUTPATIENT)
Age: 75
End: 2025-05-20

## 2025-05-24 ENCOUNTER — NON-APPOINTMENT (OUTPATIENT)
Age: 75
End: 2025-05-24

## 2025-05-29 ENCOUNTER — APPOINTMENT (OUTPATIENT)
Dept: HEART AND VASCULAR | Facility: CLINIC | Age: 75
End: 2025-05-29
Payer: MEDICARE

## 2025-05-29 VITALS
DIASTOLIC BLOOD PRESSURE: 70 MMHG | HEART RATE: 74 BPM | WEIGHT: 130 LBS | BODY MASS INDEX: 24.55 KG/M2 | HEIGHT: 61 IN | SYSTOLIC BLOOD PRESSURE: 122 MMHG | OXYGEN SATURATION: 98 %

## 2025-05-29 DIAGNOSIS — I10 ESSENTIAL (PRIMARY) HYPERTENSION: ICD-10-CM

## 2025-05-29 DIAGNOSIS — I34.0 NONRHEUMATIC MITRAL (VALVE) INSUFFICIENCY: ICD-10-CM

## 2025-05-29 DIAGNOSIS — E78.00 PURE HYPERCHOLESTEROLEMIA, UNSPECIFIED: ICD-10-CM

## 2025-05-29 DIAGNOSIS — R07.89 OTHER CHEST PAIN: ICD-10-CM

## 2025-05-29 DIAGNOSIS — R93.1 ABNORMAL FINDINGS ON DIAGNOSTIC IMAGING OF HEART AND CORONARY CIRCULATION: ICD-10-CM

## 2025-05-29 DIAGNOSIS — R01.1 CARDIAC MURMUR, UNSPECIFIED: ICD-10-CM

## 2025-05-29 DIAGNOSIS — I34.2 NONRHEUMATIC MITRAL (VALVE) STENOSIS: ICD-10-CM

## 2025-05-29 PROCEDURE — G2211 COMPLEX E/M VISIT ADD ON: CPT

## 2025-05-29 PROCEDURE — 93351 STRESS TTE COMPLETE: CPT

## 2025-05-29 PROCEDURE — 93325 DOPPLER ECHO COLOR FLOW MAPG: CPT

## 2025-05-29 PROCEDURE — 93320 DOPPLER ECHO COMPLETE: CPT

## 2025-05-29 PROCEDURE — 99215 OFFICE O/P EST HI 40 MIN: CPT

## 2025-07-22 ENCOUNTER — APPOINTMENT (OUTPATIENT)
Dept: GASTROENTEROLOGY | Facility: HOSPITAL | Age: 75
End: 2025-07-22